# Patient Record
Sex: MALE | Race: AMERICAN INDIAN OR ALASKA NATIVE | Employment: FULL TIME | URBAN - METROPOLITAN AREA
[De-identification: names, ages, dates, MRNs, and addresses within clinical notes are randomized per-mention and may not be internally consistent; named-entity substitution may affect disease eponyms.]

---

## 2023-05-08 ENCOUNTER — ANESTHESIA (OUTPATIENT)
Dept: SURGERY | Facility: HOSPITAL | Age: 33
End: 2023-05-08
Payer: COMMERCIAL

## 2023-05-08 ENCOUNTER — ANESTHESIA EVENT (OUTPATIENT)
Dept: SURGERY | Facility: HOSPITAL | Age: 33
End: 2023-05-08
Payer: COMMERCIAL

## 2023-05-08 ENCOUNTER — HOSPITAL ENCOUNTER (OUTPATIENT)
Facility: HOSPITAL | Age: 33
Setting detail: HOSPITAL OUTPATIENT SURGERY
Discharge: HOME | End: 2023-05-08
Attending: ORTHOPAEDIC SURGERY | Admitting: ORTHOPAEDIC SURGERY
Payer: COMMERCIAL

## 2023-05-08 VITALS
HEART RATE: 68 BPM | OXYGEN SATURATION: 98 % | HEIGHT: 71 IN | SYSTOLIC BLOOD PRESSURE: 125 MMHG | TEMPERATURE: 98 F | DIASTOLIC BLOOD PRESSURE: 67 MMHG | WEIGHT: 213 LBS | BODY MASS INDEX: 29.82 KG/M2 | RESPIRATION RATE: 18 BRPM

## 2023-05-08 PROCEDURE — 0LQN0ZZ REPAIR RIGHT LOWER LEG TENDON, OPEN APPROACH: ICD-10-PCS | Performed by: ORTHOPAEDIC SURGERY

## 2023-05-08 PROCEDURE — 64450 NJX AA&/STRD OTHER PN/BRANCH: CPT | Performed by: ORTHOPAEDIC SURGERY

## 2023-05-08 DEVICE — SUPER QUICKANCHOR PLUS DS WITH TWO NUMBER 2 (5 METRIC) ORTHOCORD BRAIDED COMPOSITE SUTURES, 36 INCHES (91CM), VIOLET AND BLUE, EACH ARMED WITH 2 CP-2 NEEDLES.
Type: IMPLANTABLE DEVICE | Site: LEG | Status: FUNCTIONAL
Brand: QUICKANCHOR ORTHOCORD

## 2023-05-08 RX ORDER — HYDROMORPHONE HYDROCHLORIDE 1 MG/ML
0.4 INJECTION, SOLUTION INTRAMUSCULAR; INTRAVENOUS; SUBCUTANEOUS EVERY 5 MIN PRN
Status: DISCONTINUED | OUTPATIENT
Start: 2023-05-08 | End: 2023-05-09

## 2023-05-08 RX ORDER — ACETAMINOPHEN 500 MG
1000 TABLET ORAL EVERY 8 HOURS
OUTPATIENT
Start: 2023-05-08 | End: 2023-05-09

## 2023-05-08 RX ORDER — CEFAZOLIN SODIUM/WATER 2 G/20 ML
SYRINGE (ML) INTRAVENOUS AS NEEDED
Status: DISCONTINUED | OUTPATIENT
Start: 2023-05-08 | End: 2023-05-08 | Stop reason: SURG

## 2023-05-08 RX ORDER — MORPHINE SULFATE 10 MG/ML
6 INJECTION, SOLUTION INTRAMUSCULAR; INTRAVENOUS EVERY 10 MIN PRN
Status: DISCONTINUED | OUTPATIENT
Start: 2023-05-08 | End: 2023-05-09

## 2023-05-08 RX ORDER — NALOXONE HYDROCHLORIDE 0.4 MG/ML
80 INJECTION, SOLUTION INTRAMUSCULAR; INTRAVENOUS; SUBCUTANEOUS AS NEEDED
Status: DISCONTINUED | OUTPATIENT
Start: 2023-05-08 | End: 2023-05-09

## 2023-05-08 RX ORDER — ROPIVACAINE HYDROCHLORIDE 5 MG/ML
INJECTION, SOLUTION EPIDURAL; INFILTRATION; PERINEURAL
Status: COMPLETED | OUTPATIENT
Start: 2023-05-08 | End: 2023-05-08

## 2023-05-08 RX ORDER — MORPHINE SULFATE 4 MG/ML
4 INJECTION, SOLUTION INTRAMUSCULAR; INTRAVENOUS EVERY 2 HOUR PRN
OUTPATIENT
Start: 2023-05-08 | End: 2023-05-09

## 2023-05-08 RX ORDER — LIDOCAINE HYDROCHLORIDE 10 MG/ML
INJECTION, SOLUTION INFILTRATION; PERINEURAL
Status: COMPLETED | OUTPATIENT
Start: 2023-05-08 | End: 2023-05-08

## 2023-05-08 RX ORDER — CEFAZOLIN SODIUM/WATER 2 G/20 ML
2 SYRINGE (ML) INTRAVENOUS ONCE
Status: DISCONTINUED | OUTPATIENT
Start: 2023-05-08 | End: 2023-05-08 | Stop reason: HOSPADM

## 2023-05-08 RX ORDER — MIDAZOLAM HYDROCHLORIDE 1 MG/ML
INJECTION INTRAMUSCULAR; INTRAVENOUS
Status: COMPLETED | OUTPATIENT
Start: 2023-05-08 | End: 2023-05-08

## 2023-05-08 RX ORDER — MEPERIDINE HYDROCHLORIDE 25 MG/ML
25 INJECTION INTRAMUSCULAR; INTRAVENOUS; SUBCUTANEOUS ONCE
Status: COMPLETED | OUTPATIENT
Start: 2023-05-08 | End: 2023-05-08

## 2023-05-08 RX ORDER — ACETAMINOPHEN 500 MG
1000 TABLET ORAL ONCE
Status: COMPLETED | OUTPATIENT
Start: 2023-05-08 | End: 2023-05-08

## 2023-05-08 RX ORDER — OXYCODONE HYDROCHLORIDE 5 MG/1
10 TABLET ORAL EVERY 4 HOURS PRN
OUTPATIENT
Start: 2023-05-08 | End: 2023-05-09

## 2023-05-08 RX ORDER — SODIUM CHLORIDE, SODIUM LACTATE, POTASSIUM CHLORIDE, CALCIUM CHLORIDE 600; 310; 30; 20 MG/100ML; MG/100ML; MG/100ML; MG/100ML
INJECTION, SOLUTION INTRAVENOUS CONTINUOUS
Status: DISCONTINUED | OUTPATIENT
Start: 2023-05-08 | End: 2023-05-09

## 2023-05-08 RX ORDER — MORPHINE SULFATE 4 MG/ML
4 INJECTION, SOLUTION INTRAMUSCULAR; INTRAVENOUS EVERY 10 MIN PRN
Status: DISCONTINUED | OUTPATIENT
Start: 2023-05-08 | End: 2023-05-09

## 2023-05-08 RX ORDER — HYDROMORPHONE HYDROCHLORIDE 1 MG/ML
0.2 INJECTION, SOLUTION INTRAMUSCULAR; INTRAVENOUS; SUBCUTANEOUS EVERY 5 MIN PRN
Status: DISCONTINUED | OUTPATIENT
Start: 2023-05-08 | End: 2023-05-09

## 2023-05-08 RX ORDER — MORPHINE SULFATE 4 MG/ML
6 INJECTION, SOLUTION INTRAMUSCULAR; INTRAVENOUS EVERY 2 HOUR PRN
OUTPATIENT
Start: 2023-05-08 | End: 2023-05-09

## 2023-05-08 RX ORDER — DEXAMETHASONE SODIUM PHOSPHATE 10 MG/ML
INJECTION, SOLUTION INTRAMUSCULAR; INTRAVENOUS
Status: COMPLETED | OUTPATIENT
Start: 2023-05-08 | End: 2023-05-08

## 2023-05-08 RX ORDER — MORPHINE SULFATE 4 MG/ML
2 INJECTION, SOLUTION INTRAMUSCULAR; INTRAVENOUS EVERY 10 MIN PRN
Status: DISCONTINUED | OUTPATIENT
Start: 2023-05-08 | End: 2023-05-09

## 2023-05-08 RX ORDER — DEXAMETHASONE SODIUM PHOSPHATE 4 MG/ML
VIAL (ML) INJECTION AS NEEDED
Status: DISCONTINUED | OUTPATIENT
Start: 2023-05-08 | End: 2023-05-08 | Stop reason: SURG

## 2023-05-08 RX ORDER — OXYCODONE HYDROCHLORIDE 5 MG/1
5 TABLET ORAL EVERY 4 HOURS PRN
OUTPATIENT
Start: 2023-05-08 | End: 2023-05-09

## 2023-05-08 RX ORDER — MORPHINE SULFATE 4 MG/ML
2 INJECTION, SOLUTION INTRAMUSCULAR; INTRAVENOUS EVERY 2 HOUR PRN
OUTPATIENT
Start: 2023-05-08 | End: 2023-05-09

## 2023-05-08 RX ORDER — HYDROMORPHONE HYDROCHLORIDE 1 MG/ML
0.6 INJECTION, SOLUTION INTRAMUSCULAR; INTRAVENOUS; SUBCUTANEOUS EVERY 5 MIN PRN
Status: DISCONTINUED | OUTPATIENT
Start: 2023-05-08 | End: 2023-05-09

## 2023-05-08 RX ORDER — MORPHINE SULFATE 15 MG/1
15 TABLET ORAL EVERY 4 HOURS PRN
OUTPATIENT
Start: 2023-05-08 | End: 2023-05-09

## 2023-05-08 RX ADMIN — LIDOCAINE HYDROCHLORIDE 5 ML: 10 INJECTION, SOLUTION INFILTRATION; PERINEURAL at 16:56:00

## 2023-05-08 RX ADMIN — ROPIVACAINE HYDROCHLORIDE 30 ML: 5 INJECTION, SOLUTION EPIDURAL; INFILTRATION; PERINEURAL at 16:56:00

## 2023-05-08 RX ADMIN — SODIUM CHLORIDE, SODIUM LACTATE, POTASSIUM CHLORIDE, CALCIUM CHLORIDE: 600; 310; 30; 20 INJECTION, SOLUTION INTRAVENOUS at 17:16:00

## 2023-05-08 RX ADMIN — DEXAMETHASONE SODIUM PHOSPHATE 4 MG: 4 MG/ML VIAL (ML) INJECTION at 17:46:00

## 2023-05-08 RX ADMIN — CEFAZOLIN SODIUM/WATER 2 G: 2 G/20 ML SYRINGE (ML) INTRAVENOUS at 17:46:00

## 2023-05-08 RX ADMIN — SODIUM CHLORIDE, SODIUM LACTATE, POTASSIUM CHLORIDE, CALCIUM CHLORIDE: 600; 310; 30; 20 INJECTION, SOLUTION INTRAVENOUS at 19:21:00

## 2023-05-08 RX ADMIN — MIDAZOLAM HYDROCHLORIDE 2 MG: 1 INJECTION INTRAMUSCULAR; INTRAVENOUS at 16:56:00

## 2023-05-08 RX ADMIN — DEXAMETHASONE SODIUM PHOSPHATE 10 MG: 10 INJECTION, SOLUTION INTRAMUSCULAR; INTRAVENOUS at 16:56:00

## 2023-05-08 NOTE — ANESTHESIA PROCEDURE NOTES
Airway  Date/Time: 5/8/2023 5:38 PM  Urgency: elective    Airway not difficult    General Information and Staff    Patient location during procedure: OR  Anesthesiologist: Ynes Fetnon MD  Performed: anesthesiologist   Performed by: Ynes Fenton MD  Authorized by: Ynes Fenton MD      Indications and Patient Condition  Indications for airway management: anesthesia  Spontaneous Ventilation: absent  Sedation level: deep  Preoxygenated: yes  Patient position: sniffing  Mask difficulty assessment: 0 - not attempted  Planned trial extubation    Final Airway Details  Final airway type: endotracheal airway      Successful airway: ETT  Cuffed: yes   Successful intubation technique: direct laryngoscopy  Endotracheal tube insertion site: oral  Blade: Jimi  Blade size: #3  ETT size (mm): 7.5    Cormack-Lehane Classification: grade I - full view of glottis  Placement verified by: chest auscultation and capnometry   Measured from: teeth  ETT to teeth (cm): 23  Number of attempts at approach: 1  Ventilation between attempts: none  Number of other approaches attempted: 0

## 2023-05-08 NOTE — DISCHARGE INSTRUCTIONS
Toe touch wt bearing on right leg    Keep dressing dry and in place until follow up appt    Elevate/ice to right ankle

## 2023-05-08 NOTE — ANESTHESIA PROCEDURE NOTES
Peripheral Block    Date/Time: 5/8/2023 4:56 PM    Performed by: Jos Reich MD  Authorized by: Jos Reich MD      General Information and Staff    Start Time:  5/8/2023 4:56 PM  End Time:  5/8/2023 5:07 PM  Anesthesiologist:  Jos Reich MD  Performed by: Anesthesiologist  Patient Location:  PACU    Block Placement: Pre Induction  Site Identification: real time ultrasound guided, static ultrasound guided, nerve stimulator, surface landmarks and image stored and retrievable    Block site/laterality marked before start: site marked  Reason for Block: at surgeon's request and post-op pain management    Preanesthetic Checklist: 2 patient identifers, IV checked, site marked, risks and benefits discussed, monitors and equipment checked, pre-op evaluation, timeout performed, anesthesia consent, sterile technique used, no prohibitive neurological deficits and no local skin infection at insertion site      Procedure Details    Patient Position:  Prone  Prep: ChloraPrep and patient draped    Monitoring:  Cardiac monitor, continuous pulse ox, blood pressure cuff and heart rate  Block Type:  Popliteal  Laterality:  Right  Injection Technique:  Single-shot    Needle    Needle Type:  Short-bevel  Needle Gauge:  22 G  Needle Length:  100 mm  Needle Localization:  Ultrasound guidance, anatomical landmarks and nerve stimulator  Reason for Ultrasound Use: appropriate spread of the medication was noted in real time and no ultrasound evidence of intravascular and/or intraneural injection    Nerve Stimulator: 0.4 amps  Muscle Twitch Response: plantarflexion and dorsiflexion      Assessment    Injection Assessment:  Good spread noted, incremental injection, local visualized surrounding nerve on ultrasound, low pressure, negative aspiration for heme and no pain on injection  Paresthesia Pain:  None  Heart Rate Change: No    - Patient tolerated block procedure well without evidence of immediate block related complications. Medications  5/8/2023 4:56 PM  midazolam (VERSED)injection 2mg/2ml - Intravenous   2 mg - 5/8/2023 4:56:00 PM  lidocaine injection 1% - Intradermal   5 mL - 5/8/2023 4:56:00 PM  ropivacaine (NAROPIN) injection 0.5% - Infiltration   30 mL - 5/8/2023 4:56:00 PM  dexamethasone (DECADRON) PF injection 10 mg/ml - Injection   10 mg - 5/8/2023 4:56:00 PM    Additional Comments

## 2023-05-09 NOTE — OPERATIVE REPORT
[facility]    PATIENT'S NAME: Jeimy WILLIAMSON   ATTENDING PHYSICIAN: Chen Erwin. MD Ximena   OPERATING PHYSICIAN: Chen Erwin. Kelle Vail MD   PATIENT ACCOUNT#:   [id number]    LOCATION:  [facility] 1 [facility] 10  MEDICAL RECORD #:   [id number]       YOB: 1990  ADMISSION DATE:       05/08/2023      OPERATION DATE:  05/08/2023    OPERATIVE REPORT      PREOPERATIVE DIAGNOSIS:  Subacute right Achilles rupture. POSTOPERATIVE DIAGNOSIS:  Subacute right Achilles rupture. PROCEDURE:  Right Achilles tendon repair. ASSISTANT:  None. IMPLANTS:  Mitek Super anchor x2. ESTIMATED BLOOD LOSS:  50 mL. INTRAVENOUS FLUIDS:  1 L. ANESTHESIA:  General with block. TOURNIQUET TIME:  70 minutes. COMPLICATIONS:  None. SPECIMENS:  None. DISPOSITION:  Stable to recovery room. INDICATIONS:  The patient is a 42-year-old male with a history of injury to his right calf. He does not remember when it first happened, but he had a re-injury in early April. Preop MRI was consistent with a tear of the distal Achilles at its insertion on the calcaneus. FINDINGS:  Retracted subacute tear of the distal insertion of the Achilles tendon. OPERATIVE TECHNIQUE:  The risks, benefits, and alternatives were explained. The patient agreed to proceed. The right ankle was identified as the operative site, was initialed by the surgeon prior to proceeding to the operating room. Patient was given 2 g Ancef preoperatively, was taken to the operating room and placed in supine position with all bony prominences padded and general anesthesia was induced. A nonsterile tourniquet was placed in the right thigh. The right lower extremity was cleaned and prepped in the usual sterile fashion with a ChloraPrep and time-out was undertaken. The patient was placed in a prone position with all bony prominences well padded.   A longitudinal incision was made over the medial aspect of the distal Achilles tendon. It was carried sharply down through skin. Full-thickness skin flaps were made medially and laterally. At the more distal aspect of the incision, the tendon had been replaced essentially by 100% scar tissue with the exception of a small remnant over the medial calcaneus. It remained attached to bone. The more proximal tendon was found by removing this intervening scar tissue. The tendon edge was then debrided, freed with blunt dissection proximally with a traction suture applied and was able to reach due to the calcaneus with the foot in plantar flexion. The calcaneus was then debrided down to bleeding bone. Two Mitek Super anchors were placed in this footprint. The first suture from each anchor was passed through the Achilles tendon in a Krackow fashion. The second limb of the suture was passed in a simple fashion. The second suture from each was passed in horizontal mattress fashion with the foot in full plantar flexion. The sutures were tied sequentially bringing the tendon down to the calcaneus. This was then augmented with multiple interrupted #2 FiberWire sutures from the remnant of the Achilles tendon attached to the medial portion of the calcaneus. This wound was then copiously irrigated out. The skin approximated with interrupted 3-0 Monocryl and closed with interrupted 3-0 nylon. Sterile dressings were applied. The tourniquet was deflated, and the toes were pink and warm. The patient was placed into a short-leg posterior mold splint in full equinus, extubated and taken to the recovery room in stable condition. Dictated By Cj Rodriguez.  MD Ximena  d: 05/08/2023 19:24:47  t: 05/08/2023 22:47:37  [facility] [id number]  [facility]/

## 2023-05-09 NOTE — BRIEF OP NOTE
Pre-Operative Diagnosis: Right achilles rupture     Post-Operative Diagnosis: Right achilles rupture      Procedure Performed:   Right achilles repair    Surgeon(s) and Role:     * Katharine Bueno MD - Primary    Assistant(s):        Surgical Findings: subacute achilles rupture      Specimen: none     Estimated Blood Loss: Blood Output: 50 mL (5/8/2023  6:56 PM)      Dictation Number:  [id number]    Karsten Carrillo MD  5/8/2023  7:22 PM

## 2023-05-09 NOTE — ANESTHESIA POSTPROCEDURE EVALUATION
Patient: Fortunato Flores    Procedure Summary     Date: 05/08/23 Room / Location: [address] [place] [address] MAIN OR 10 / [address] [place] [address] MAIN OR    Anesthesia Start: 1728 Anesthesia Stop: 1926    Procedure: Right achilles repair (Right: Lower Leg) Diagnosis: (Right achilles rupture)    Surgeons: Concepcion Forbes MD Anesthesiologist: Autumn Sanderson MD    Anesthesia Type: general ASA Status: 1          Anesthesia Type: general    Vitals Value Taken Time   /80 05/08/23 1927   Temp 97.3 05/08/23 1927   Pulse 102 05/08/23 1926   Resp 28 05/08/23 1926   SpO2 96 05/08/23 1927   Vitals shown include unvalidated device data.     [address] [place] [address] AN Post Evaluation:   Patient Evaluated in PACU  Patient Participation: complete - patient participated  Level of Consciousness: awake  Pain Management: adequate  Airway Patency:patent  Dental exam unchanged from preop  Yes    Cardiovascular Status: acceptable  Respiratory Status: acceptable  Postoperative Hydration acceptable      Inga Goodrich MD  5/8/2023 7:27 PM

## (undated) DEVICE — INTENDED FOR TISSUE SEPARATION, AND OTHER PROCEDURES THAT REQUIRE A SHARP SURGICAL BLADE TO PUNCTURE OR CUT.: Brand: BARD-PARKER ® STAINLESS STEEL BLADES

## (undated) DEVICE — SUT ETHILON 3-0 PS-2 1669H

## (undated) DEVICE — MEGADYNE ELECTRODE ADULT PT RT

## (undated) DEVICE — DISPOSABLE TOURNIQUET CUFF SINGLE BLADDER, DUAL PORT AND QUICK CONNECT CONNECTOR: Brand: COLOR CUFF

## (undated) DEVICE — GAUZE TRAY STERILE 4X4 12PLY

## (undated) DEVICE — GAMMEX® PI HYBRID SIZE 8, STERILE POWDER-FREE SURGICAL GLOVE, POLYISOPRENE AND NEOPRENE BLEND: Brand: GAMMEX

## (undated) DEVICE — SUT MONOCRYL 3-0 PS-2 Y497G

## (undated) DEVICE — SOL NACL IRRIG 0.9% 1000ML BTL

## (undated) DEVICE — INTENT TO BE USED WITH SUTURE MATERIAL FOR TISSUE CLOSURE: Brand: RICHARD-ALLAN® NEEDLE 3/8 CIRCLE TROCAR

## (undated) DEVICE — SPLINT PRECUT SYNTH 5X30

## (undated) DEVICE — DRAPE SRG 70X60IN SPLT U IMPRV

## (undated) DEVICE — COTTON UNDERCAST PADDING,REGULAR FINISH: Brand: WEBRIL

## (undated) DEVICE — SUT VICRYL 2-0 FS-1 J443H

## (undated) DEVICE — LOWER EXTREMITY: Brand: MEDLINE INDUSTRIES, INC.

## (undated) DEVICE — ENCORE® LATEX MICRO SIZE 8, STERILE LATEX POWDER-FREE SURGICAL GLOVE: Brand: ENCORE

## (undated) DEVICE — SUT ETHILON 3-0 FS-1 669H

## (undated) DEVICE — SUT FIBERWIRE 2 T-5 AR-7200